# Patient Record
Sex: MALE | Race: WHITE | NOT HISPANIC OR LATINO | URBAN - METROPOLITAN AREA
[De-identification: names, ages, dates, MRNs, and addresses within clinical notes are randomized per-mention and may not be internally consistent; named-entity substitution may affect disease eponyms.]

---

## 2017-08-25 ENCOUNTER — OFFICE VISIT - HEALTHEAST (OUTPATIENT)
Dept: FAMILY MEDICINE | Facility: CLINIC | Age: 82
End: 2017-08-25

## 2017-08-25 DIAGNOSIS — Z80.42 FAMILY HISTORY OF MALIGNANT NEOPLASM OF PROSTATE: ICD-10-CM

## 2017-08-25 DIAGNOSIS — G45.9 TRANSIENT CEREBRAL ISCHEMIA, UNSPECIFIED TYPE: ICD-10-CM

## 2017-08-25 DIAGNOSIS — H35.30 MACULAR DEGENERATION (SENILE) OF RETINA: ICD-10-CM

## 2017-08-25 DIAGNOSIS — I10 ESSENTIAL HYPERTENSION WITH GOAL BLOOD PRESSURE LESS THAN 130/85: ICD-10-CM

## 2017-08-25 DIAGNOSIS — I50.9 HEART FAILURE (H): ICD-10-CM

## 2017-08-25 ASSESSMENT — MIFFLIN-ST. JEOR: SCORE: 1469.39

## 2017-08-28 ENCOUNTER — RECORDS - HEALTHEAST (OUTPATIENT)
Dept: ADMINISTRATIVE | Facility: OTHER | Age: 82
End: 2017-08-28

## 2017-09-06 ENCOUNTER — COMMUNICATION - HEALTHEAST (OUTPATIENT)
Dept: FAMILY MEDICINE | Facility: CLINIC | Age: 82
End: 2017-09-06

## 2018-08-24 ENCOUNTER — OFFICE VISIT - HEALTHEAST (OUTPATIENT)
Dept: FAMILY MEDICINE | Facility: CLINIC | Age: 83
End: 2018-08-24

## 2018-08-24 ENCOUNTER — COMMUNICATION - HEALTHEAST (OUTPATIENT)
Dept: FAMILY MEDICINE | Facility: CLINIC | Age: 83
End: 2018-08-24

## 2018-08-24 DIAGNOSIS — L71.9 ROSACEA: ICD-10-CM

## 2018-08-24 DIAGNOSIS — C43.4 MALIGNANT MELANOMA OF SKIN OF SCALP AND NECK (H): ICD-10-CM

## 2018-08-24 DIAGNOSIS — K21.9 GASTROESOPHAGEAL REFLUX DISEASE WITHOUT ESOPHAGITIS: ICD-10-CM

## 2018-08-24 DIAGNOSIS — C18.9 MALIGNANT NEOPLASM OF COLON, UNSPECIFIED PART OF COLON (H): ICD-10-CM

## 2018-08-24 DIAGNOSIS — Z80.42 FAMILY HISTORY OF MALIGNANT NEOPLASM OF PROSTATE: ICD-10-CM

## 2018-08-24 DIAGNOSIS — I50.9 HEART FAILURE (H): ICD-10-CM

## 2018-08-24 DIAGNOSIS — E78.2 MIXED HYPERLIPIDEMIA: ICD-10-CM

## 2018-08-24 DIAGNOSIS — I10 ESSENTIAL HYPERTENSION: ICD-10-CM

## 2018-08-24 DIAGNOSIS — F34.1 DYSTHYMIC DISORDER: ICD-10-CM

## 2018-08-24 DIAGNOSIS — H35.30 MACULAR DEGENERATION (SENILE) OF RETINA: ICD-10-CM

## 2018-08-24 DIAGNOSIS — G45.9 TRANSIENT CEREBRAL ISCHEMIA, UNSPECIFIED TYPE: ICD-10-CM

## 2018-08-24 LAB
ANION GAP SERPL CALCULATED.3IONS-SCNC: 12 MMOL/L (ref 5–18)
BUN SERPL-MCNC: 12 MG/DL (ref 8–28)
CALCIUM SERPL-MCNC: 9.8 MG/DL (ref 8.5–10.5)
CHLORIDE BLD-SCNC: 105 MMOL/L (ref 98–107)
CO2 SERPL-SCNC: 26 MMOL/L (ref 22–31)
CREAT SERPL-MCNC: 0.88 MG/DL (ref 0.7–1.3)
GFR SERPL CREATININE-BSD FRML MDRD: >60 ML/MIN/1.73M2
GLUCOSE BLD-MCNC: 107 MG/DL (ref 70–125)
POTASSIUM BLD-SCNC: 3.8 MMOL/L (ref 3.5–5)
SODIUM SERPL-SCNC: 143 MMOL/L (ref 136–145)

## 2018-09-25 ENCOUNTER — RECORDS - HEALTHEAST (OUTPATIENT)
Dept: ADMINISTRATIVE | Facility: OTHER | Age: 83
End: 2018-09-25

## 2019-07-29 ENCOUNTER — COMMUNICATION - HEALTHEAST (OUTPATIENT)
Dept: FAMILY MEDICINE | Facility: CLINIC | Age: 84
End: 2019-07-29

## 2019-08-14 ENCOUNTER — COMMUNICATION - HEALTHEAST (OUTPATIENT)
Dept: FAMILY MEDICINE | Facility: CLINIC | Age: 84
End: 2019-08-14

## 2019-08-14 DIAGNOSIS — I10 ESSENTIAL HYPERTENSION: ICD-10-CM

## 2019-08-14 DIAGNOSIS — I50.9 HEART FAILURE, UNSPECIFIED HF CHRONICITY, UNSPECIFIED HEART FAILURE TYPE (H): ICD-10-CM

## 2019-08-15 ENCOUNTER — COMMUNICATION - HEALTHEAST (OUTPATIENT)
Dept: FAMILY MEDICINE | Facility: CLINIC | Age: 84
End: 2019-08-15

## 2019-08-15 DIAGNOSIS — I10 ESSENTIAL HYPERTENSION: ICD-10-CM

## 2019-09-08 ENCOUNTER — COMMUNICATION - HEALTHEAST (OUTPATIENT)
Dept: FAMILY MEDICINE | Facility: CLINIC | Age: 84
End: 2019-09-08

## 2019-09-09 ENCOUNTER — OFFICE VISIT - HEALTHEAST (OUTPATIENT)
Dept: FAMILY MEDICINE | Facility: CLINIC | Age: 84
End: 2019-09-09

## 2019-09-09 DIAGNOSIS — C18.9 MALIGNANT NEOPLASM OF COLON, UNSPECIFIED PART OF COLON (H): ICD-10-CM

## 2019-09-09 DIAGNOSIS — I50.9 HEART FAILURE, UNSPECIFIED HF CHRONICITY, UNSPECIFIED HEART FAILURE TYPE (H): ICD-10-CM

## 2019-09-09 DIAGNOSIS — F41.1 ANXIETY STATE: ICD-10-CM

## 2019-09-09 DIAGNOSIS — C43.4 MALIGNANT MELANOMA OF SKIN OF SCALP AND NECK (H): ICD-10-CM

## 2019-09-09 DIAGNOSIS — H35.30 MACULAR DEGENERATION (SENILE) OF RETINA: ICD-10-CM

## 2019-09-09 DIAGNOSIS — G45.9 TIA (TRANSIENT ISCHEMIC ATTACK): ICD-10-CM

## 2019-09-09 DIAGNOSIS — L71.9 ROSACEA: ICD-10-CM

## 2019-09-09 DIAGNOSIS — K21.9 GASTROESOPHAGEAL REFLUX DISEASE WITHOUT ESOPHAGITIS: ICD-10-CM

## 2019-09-09 DIAGNOSIS — Z80.42 FAMILY HISTORY OF MALIGNANT NEOPLASM OF PROSTATE: ICD-10-CM

## 2019-09-09 DIAGNOSIS — E78.2 MIXED HYPERLIPIDEMIA: ICD-10-CM

## 2019-09-09 DIAGNOSIS — I10 ESSENTIAL HYPERTENSION: ICD-10-CM

## 2019-09-09 DIAGNOSIS — F34.1 DYSTHYMIC DISORDER: ICD-10-CM

## 2019-09-09 LAB
ANION GAP SERPL CALCULATED.3IONS-SCNC: 12 MMOL/L (ref 5–18)
BUN SERPL-MCNC: 14 MG/DL (ref 8–28)
CALCIUM SERPL-MCNC: 9.6 MG/DL (ref 8.5–10.5)
CHLORIDE BLD-SCNC: 106 MMOL/L (ref 98–107)
CO2 SERPL-SCNC: 24 MMOL/L (ref 22–31)
CREAT SERPL-MCNC: 1.24 MG/DL (ref 0.7–1.3)
GFR SERPL CREATININE-BSD FRML MDRD: 56 ML/MIN/1.73M2
GLUCOSE BLD-MCNC: 100 MG/DL (ref 70–125)
POTASSIUM BLD-SCNC: 4.7 MMOL/L (ref 3.5–5)
SODIUM SERPL-SCNC: 142 MMOL/L (ref 136–145)

## 2019-09-09 RX ORDER — FUROSEMIDE 20 MG
TABLET ORAL
Qty: 90 TABLET | Refills: 3 | Status: SHIPPED | OUTPATIENT
Start: 2019-09-09

## 2019-09-09 RX ORDER — AMLODIPINE BESYLATE 10 MG/1
TABLET ORAL
Qty: 90 TABLET | Refills: 3 | Status: SHIPPED | OUTPATIENT
Start: 2019-09-09

## 2019-09-09 RX ORDER — LISINOPRIL 5 MG/1
TABLET ORAL
Qty: 90 TABLET | Refills: 3 | Status: SHIPPED | OUTPATIENT
Start: 2019-09-09

## 2019-09-09 RX ORDER — METOPROLOL SUCCINATE 100 MG/1
100 TABLET, EXTENDED RELEASE ORAL DAILY
Qty: 90 TABLET | Refills: 3 | Status: SHIPPED | OUTPATIENT
Start: 2019-09-09

## 2019-09-10 ENCOUNTER — COMMUNICATION - HEALTHEAST (OUTPATIENT)
Dept: FAMILY MEDICINE | Facility: CLINIC | Age: 84
End: 2019-09-10

## 2019-11-21 ENCOUNTER — COMMUNICATION - HEALTHEAST (OUTPATIENT)
Dept: FAMILY MEDICINE | Facility: CLINIC | Age: 84
End: 2019-11-21

## 2019-11-21 DIAGNOSIS — I10 ESSENTIAL HYPERTENSION: ICD-10-CM

## 2019-11-21 DIAGNOSIS — I50.9 HEART FAILURE, UNSPECIFIED HF CHRONICITY, UNSPECIFIED HEART FAILURE TYPE (H): ICD-10-CM

## 2020-04-17 ENCOUNTER — COMMUNICATION - HEALTHEAST (OUTPATIENT)
Dept: FAMILY MEDICINE | Facility: CLINIC | Age: 85
End: 2020-04-17

## 2020-06-06 ENCOUNTER — COMMUNICATION - HEALTHEAST (OUTPATIENT)
Dept: SCHEDULING | Facility: CLINIC | Age: 85
End: 2020-06-06

## 2020-08-13 ENCOUNTER — RECORDS - HEALTHEAST (OUTPATIENT)
Dept: ADMINISTRATIVE | Facility: OTHER | Age: 85
End: 2020-08-13

## 2020-08-27 ENCOUNTER — RECORDS - HEALTHEAST (OUTPATIENT)
Dept: ADMINISTRATIVE | Facility: OTHER | Age: 85
End: 2020-08-27

## 2020-09-10 ENCOUNTER — RECORDS - HEALTHEAST (OUTPATIENT)
Dept: ADMINISTRATIVE | Facility: OTHER | Age: 85
End: 2020-09-10

## 2020-09-26 ENCOUNTER — COMMUNICATION - HEALTHEAST (OUTPATIENT)
Dept: FAMILY MEDICINE | Facility: CLINIC | Age: 85
End: 2020-09-26

## 2020-09-28 ENCOUNTER — COMMUNICATION - HEALTHEAST (OUTPATIENT)
Dept: FAMILY MEDICINE | Facility: CLINIC | Age: 85
End: 2020-09-28

## 2021-05-25 ENCOUNTER — RECORDS - HEALTHEAST (OUTPATIENT)
Dept: ADMINISTRATIVE | Facility: CLINIC | Age: 86
End: 2021-05-25

## 2021-05-26 ENCOUNTER — RECORDS - HEALTHEAST (OUTPATIENT)
Dept: ADMINISTRATIVE | Facility: CLINIC | Age: 86
End: 2021-05-26

## 2021-05-29 ENCOUNTER — RECORDS - HEALTHEAST (OUTPATIENT)
Dept: ADMINISTRATIVE | Facility: CLINIC | Age: 86
End: 2021-05-29

## 2021-05-30 ENCOUNTER — RECORDS - HEALTHEAST (OUTPATIENT)
Dept: ADMINISTRATIVE | Facility: CLINIC | Age: 86
End: 2021-05-30

## 2021-05-31 VITALS — HEIGHT: 68 IN | WEIGHT: 181.4 LBS | BODY MASS INDEX: 27.49 KG/M2

## 2021-05-31 NOTE — TELEPHONE ENCOUNTER
Due to be seen    Rx renewed per Medication Renewal Policy. Medication was last renewed on 8/24/18.    Veronica Guaman, [facility] Triage/Med Refill 8/15/2019     Requested Prescriptions   Pending Prescriptions Disp Refills     metoprolol succinate (TOPROL-XL) 100 MG 24 hr tablet [Pharmacy Med Name: METOPROLOL ER SUCCINATE 100MG TABS] 90 tablet 0     Sig: TAKE 1 TABLET(100 MG) BY MOUTH DAILY       Beta-Blockers Refill Protocol Passed - 8/15/2019  6:52 PM        Passed - PCP or prescribing provider visit in past 12 months or next 3 months     Last office visit with prescriber/PCP: 8/24/2018 Shaunna Padron MD OR same dept: 8/24/2018 Shaunna Padron MD OR same specialty: 8/24/2018 Shaunna Padron MD  Last physical: 8/25/2017 Last MTM visit: Visit date not found   Next visit within 3 mo: Visit date not found  Next physical within 3 mo: Visit date not found  Prescriber OR PCP: Shaunna Padron MD  Last diagnosis associated with med order: There are no diagnoses linked to this encounter.  If protocol passes may refill for 12 months if within 3 months of last provider visit (or a total of 15 months).             Passed - Blood pressure filed in past 12 months     BP Readings from Last 1 Encounters:   08/24/18 108/66

## 2021-05-31 NOTE — TELEPHONE ENCOUNTER
Due to be seen with labs    Rx renewed per Medication Renewal Policy. Medication was last renewed on 8/24/18.    Veronica Guaman, [facility] Triage/Med Refill 8/14/2019     Requested Prescriptions   Pending Prescriptions Disp Refills     lisinopril (PRINIVIL,ZESTRIL) 5 MG tablet 90 tablet 3     Sig: TAKE 1 TABLET BY MOUTH ONCE DAILY       Ace Inhibitors Refill Protocol Passed - 8/14/2019  1:49 PM        Passed - PCP or prescribing provider visit in past 12 months       Last office visit with prescriber/PCP: 8/24/2018 Shaunna Padron MD OR same dept: 8/24/2018 Shaunna Padron MD OR same specialty: 8/24/2018 Shaunna Padron MD  Last physical: 8/25/2017 Last MTM visit: Visit date not found   Next visit within 3 mo: Visit date not found  Next physical within 3 mo: Visit date not found  Prescriber OR PCP: Shaunna Padron MD  Last diagnosis associated with med order: There are no diagnoses linked to this encounter.  If protocol passes may refill for 12 months if within 3 months of last provider visit (or a total of 15 months).             Passed - Serum Potassium in past 12 months     Lab Results   Component Value Date    Potassium 3.8 08/24/2018             Passed - Blood pressure filed in past 12 months     BP Readings from Last 1 Encounters:   08/24/18 108/66             Passed - Serum Creatinine in past 12 months     Creatinine   Date Value Ref Range Status   08/24/2018 0.88 0.70 - 1.30 mg/dL Final             amLODIPine (NORVASC) 10 MG tablet 90 tablet 3     Sig: TAKE 1 TABLET(10 MG TOTAL) BY MOUTH EVERY DAY       Calcium-Channel Blockers Protocol Passed - 8/14/2019  1:49 PM        Passed - PCP or prescribing provider visit in past 12 months or next 3 months     Last office visit with prescriber/PCP: 8/24/2018 Shaunna Padron MD OR same dept: 8/24/2018 Shaunna Padron MD OR same specialty: 8/24/2018 Shaunna Padron MD  Last physical: 8/25/2017 Last MTM visit: Visit date not found   Next visit within 3  mo: Visit date not found  Next physical within 3 mo: Visit date not found  Prescriber OR PCP: Shaunna Padron MD  Last diagnosis associated with med order: There are no diagnoses linked to this encounter.  If protocol passes may refill for 12 months if within 3 months of last provider visit (or a total of 15 months).             Passed - Blood pressure filed in past 12 months     BP Readings from Last 1 Encounters:   08/24/18 108/66             furosemide (LASIX) 20 MG tablet 90 tablet 3     Sig: TAKE 1 TABLET BY MOUTH ONCE DAILY       Diuretics/Combination Diuretics Refill Protocol  Passed - 8/14/2019  1:49 PM        Passed - Visit with PCP or prescribing provider visit in past 12 months     Last office visit with prescriber/PCP: 8/24/2018 Shaunna Padron MD OR same dept: 8/24/2018 Shaunna Padron MD OR same specialty: 8/24/2018 Shaunna Padron MD  Last physical: 8/25/2017 Last MTM visit: Visit date not found   Next visit within 3 mo: Visit date not found  Next physical within 3 mo: Visit date not found  Prescriber OR PCP: Shaunna Padron MD  Last diagnosis associated with med order: There are no diagnoses linked to this encounter.  If protocol passes may refill for 12 months if within 3 months of last provider visit (or a total of 15 months).             Passed - Serum Potassium in past 12 months      Lab Results   Component Value Date    Potassium 3.8 08/24/2018             Passed - Serum Sodium in past 12 months      Lab Results   Component Value Date    Sodium 143 08/24/2018             Passed - Blood pressure on file in past 12 months     BP Readings from Last 1 Encounters:   08/24/18 108/66             Passed - Serum Creatinine in past 12 months      Creatinine   Date Value Ref Range Status   08/24/2018 0.88 0.70 - 1.30 mg/dL Final

## 2021-06-01 VITALS — WEIGHT: 178.2 LBS | BODY MASS INDEX: 27.5 KG/M2

## 2021-06-01 NOTE — PROGRESS NOTES
Chief complaint: Medication refill    HPI: The patient has hypertension and heart failure and a plethora of other medical issues.  He does not want to treat anything but is high blood pressure and his heart failure so he will continue to take the medications he needed refilled today.  He is willing to do a basic metabolic panel lab and get his refills but he is really not willing to do anything else.    His wife says that they get the flu shots at the pharmacy    Both of them are hard of hearing and wears hearing aids.  The patient himself is also wearing glasses.  He would prefer to die in his sleep at home but is not yet ready to stop taking all of his medications.  He does not want any interventions aside from the minimal blood pressure and diuretic medication.    Objective:/59 (Patient Site: Right Arm, Patient Position: Sitting, Cuff Size: Adult Regular)   Pulse (!) 58   Wt 163 lb 11.2 oz (74.3 kg)   SpO2 93%   BMI 25.26 kg/m    He breathes somewhat rapidly.  He is wearing hearing aids bilaterally and glasses.  His heart and lung exam is pretty unremarkable he does have a little of pedal edema and it gets worse of the day goes on because he sits all day in his chair.  His wife tries to encourage him to put his feet up but he just puts them up on a footstool, they are not elevated above the level of his heart by any means.    Assessment.  Hypertension  Heart failure  Hyperlipidemia which she will not treat  History of prostate cancer  History of colon cancer  History of skin cancer  Rosacea  TIA    Plan: He only wanted to treat the blood pressure and heart failure so I will refill those medications and do a basic metabolic panel.  He will be notified of the results.  I have encouraged them to get a flu shot.  He will come back in 1 year sooner as needed    At this point they did not want any referral to home care just to see if there are any other aids that his wife could have she has access to some  "resources in the community.  They did not want to do any hospice care or hospice referral but his wife will let me know if she wants to pursue any other assistance for helping to manage him at home.  He is likely having ongoing neurologic events because he will sometimes \"zone out\" for an hour or 2 and then come back to being lucid and aware.  Again they do not want to do anything about these issues so until or unless they do, we will just provide whatever support they are willing to accept.  "

## 2021-06-02 ENCOUNTER — RECORDS - HEALTHEAST (OUTPATIENT)
Dept: ADMINISTRATIVE | Facility: CLINIC | Age: 86
End: 2021-06-02

## 2021-06-03 VITALS
SYSTOLIC BLOOD PRESSURE: 101 MMHG | BODY MASS INDEX: 25.26 KG/M2 | DIASTOLIC BLOOD PRESSURE: 59 MMHG | OXYGEN SATURATION: 93 % | HEART RATE: 58 BPM | WEIGHT: 163.7 LBS

## 2021-06-08 NOTE — TELEPHONE ENCOUNTER
[facility].  Son has called in to receive services.  Hospice orders needed, please call [facility] [phone]    Jessica Valladares RN, MA  [facility]    Triage Nurse Advisor

## 2021-06-12 NOTE — PROGRESS NOTES
ASSESSMENT:  1. Heart failure   meds refilled    2. Prostate Cancer  Followed for 5 years; PSA was 0, so not doing any more follow up    3. Macular Degeneration  No further treatment available    4. Transient cerebral ischemia, unspecified type       5. Essential hypertension with goal blood pressure less than 130/85     - Comprehensive Metabolic Panel         PLAN:  He wanted only the bare minimum to be done today so we will do As a metabolic panel and refill his medications.  [facility]    Orders Placed This Encounter   Procedures     Comprehensive Metabolic Panel     Medications Discontinued During This Encounter   Medication Reason     amLODIPine (NORVASC) 10 MG tablet Reorder     atenolol (TENORMIN) 100 MG tablet Reorder     furosemide (LASIX) 20 MG tablet Reorder     lisinopril (PRINIVIL,ZESTRIL) 5 MG tablet Reorder         Health Maintenance Due   Topic Date Due     FALL RISK ASSESSMENT  01/07/2000     COLONOSCOPY  09/09/2016     INFLUENZA VACCINE RULE BASED (1) 08/01/2017         CHIEF COMPLAINT:  Chief Complaint   Patient presents with     Annual Exam     not fasting     Medication Refill     Speech Problem     with confusion since stroke 2 years ago         HISTORY OF PRESENT ILLNESS:  Terence Arellano is a 82 y.o. male presenting to the clinic today for a physical exam.     He is here with his wife.  He wears trifocals but his macular degeneration is so significant, but he does not see very well at all and he does not have much depth perception.  He has bilateral hearing aids and still does not hear very well.  His wife also does not hear very well.    He is not fasting and does not want to have fasting lab completed.  He really does not want to have anything done but he is willing to continue taking his medication for his heart and to avoid another stroke.  I told him that in order to do that, we needed to do some blood work so they are willing to do  that.    He is having more difficulty with his speech but he is not willing to do any therapy for that at this time.    He and his wife moved to a 1 level [facility] and she takes care of his pills and takes care of him.  When he is out and about he uses his cane otherwise he walks around furniture very slowly at home in order to avoid a fall.    His teeth are capped but he still does not brush his teeth.  We talked about the importance of continuing to brush his teeth so that he did not get gum disease which could cause some problems with heart disease.    Healthy Habits:   Patient reports regular exercise, dental and eye exams. Uses healthy diet. Always uses seatbelts. Reports uses medications as directed.  Alcohol: rare  Smoking: none  Caffeine use: 2-3 per day  Drug use: none  Birth control: abstinence    REVIEW OF SYSTEMS:   All other systems are negative.    PFSH:     History   Smoking Status     Former Smoker   Smokeless Tobacco     Never Used     Family History   Problem Relation Age of Onset     Dementia Mother      Colon cancer Father      Social History     Social History     Marital status:      Spouse name: N/A     Number of children: N/A     Years of education: N/A     Occupational History     Not on file.     Social History Main Topics     Smoking status: Former Smoker     Smokeless tobacco: Never Used     Alcohol use No      Comment: rare     Drug use: No     Sexual activity: Not on file     Other Topics Concern     Not on file     Social History Narrative     Past Surgical History:   Procedure Laterality Date     MI ABDOMEN SURGERY PROC UNLISTED      Description: Hernia Repair;  Recorded: 12/06/2008;     MI AMPUTATION TOE,MT-P JT      Description: Surgery Left Foot Amputation MTP Fifth Toe;  Recorded: 12/06/2008;  Comments: 1948     MI APPENDECTOMY      Description: Appendectomy;  Recorded: 01/24/2011;     MI INCISE FINGER TENDON SHEATH      Description: Hand Incision Tendon Sheath Of A Finger;  " Recorded: 10/29/2008;     CA PART REMOVAL COLON W ANASTOMOSIS      Description: Partial Colectomy;  Recorded: 01/24/2011;  Comments: adjuvant chem as well     Allergies   Allergen Reactions     Atorvastatin      muscle pain       Ezetimibe      muscle pain       Penicillins Hives     Simvastatin      Annotation: muscle pain and ankle swelling       Active Ambulatory Problems     Diagnosis Date Noted     Esophageal reflux      Hyperlipidemia      Anxiety      Dysthymic Disorder      Xerosis Cutis      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      Diverticulosis      Hypertension      Rosacea      Carcinoma Of The Large Intestine      Malignant Melanoma Of The Scalp      Prostate Cancer      Macular Degeneration      TIA (transient ischemic attack) 06/06/2015     Heart failure 06/15/2015     Resolved Ambulatory Problems     Diagnosis Date Noted     Prostate Cancer      Past Medical History:   Diagnosis Date     Carcinoma of large intestine      Dementia      Diverticulosis      Esophageal reflux      Hyperlipidemia      Macular degeneration      Malignant melanoma of scalp or neck      Prostate cancer      Rosacea      TIA (transient ischemic attack)      Xerosis cutis        VITALS:  Vitals:    08/25/17 1335   BP: 124/82   Patient Site: Right Arm   Patient Position: Sitting   Cuff Size: Adult Regular   Pulse: 72   Weight: 181 lb 6.4 oz (82.3 kg)   Height: 5' 7.5\" (1.715 m)     BP Readings from Last 3 Encounters:   08/25/17 124/82   07/29/16 108/80   10/05/15 134/66     Wt Readings from Last 3 Encounters:   08/25/17 181 lb 6.4 oz (82.3 kg)   07/29/16 187 lb 3.2 oz (84.9 kg)   10/05/15 191 lb 3.2 oz (86.7 kg)     Body mass index is 27.99 kg/(m^2).    PHYSICAL EXAM:  General Appearance: Alert, cooperative, no distress, appears older than stated age  Head: Normocephalic, without obvious abnormality, atraumatic  Eyes: PERRL, conjunctiva/corneas clear, EOM's intact. Trifocal glasses  Ears: bilateral hearing " aids  Nose: Nares normal, septum midline,mucosa normal, no drainage  Throat: Lips, mucosa, and tongue normal; teeth and gums normal. Teeth are capped  Neck: Supple, symmetrical, trachea midline,   Back:    Lungs: Clear to auscultation bilaterally . He breathes rapidly and shallowly; this is not new  Heart: Regular rate and rhythm, S1 and S2 normal, no murmur, rub, or gallop   Extremities: Extremities normal, atraumatic, no cyanosis or edema  Skin: Skin color, texture, turgor normal, no rashes or lesions       QUALITY MEASURES:  The following are part of a depression follow up plan for the patient:  coping support management and emotional support assessment. Most of this is from the hearing decrease and the vision impairment     MEDICATIONS:  Current Outpatient Prescriptions   Medication Sig Dispense Refill     amLODIPine (NORVASC) 10 MG tablet TAKE 1 TABLET(10 MG TOTAL) BY MOUTH EVERY DAY 90 tablet 3     aspirin 81 MG EC tablet Take 81 mg by mouth daily.       atenolol (TENORMIN) 100 MG tablet Take 0.5 tablets (50 mg total) by mouth 2 (two) times a day. 90 tablet 3     cholecalciferol, vitamin D3, 1,000 unit tablet Take 2,000 Units by mouth daily.       furosemide (LASIX) 20 MG tablet TAKE 1 TABLET BY MOUTH ONCE DAILY 90 tablet 3     lisinopril (PRINIVIL,ZESTRIL) 5 MG tablet TAKE 1 TABLET BY MOUTH ONCE DAILY 90 tablet 3     lutein 6 mg cap Take 1 capsule by mouth daily.       MULTIVIT &MINERALS/FERROUS FUM (MULTI VITAMIN ORAL) Take 1 tablet by mouth daily.       omeprazole (PRILOSEC OTC) 20 MG tablet Take 10 mg by mouth 2 (two) times a week.        VIT C/E/ZN/COPPR/LUTEIN/ZEAXAN (PRESERVISION AREDS 2 ORAL) Take by mouth.       metoprolol succinate (TOPROL XL) 100 MG 24 hr tablet Take 1 tablet (100 mg total) by mouth daily. 90 tablet 3     No current facility-administered medications for this visit.

## 2021-06-20 NOTE — PROGRESS NOTES
"Chief complaint: Medication check    HPI: This 83-year-old gentleman with multiple medical problems is here with his wife.  Both of them are hearing impaired wearing hearing aids and both of them are wearing glasses.  He does not want anything done, he does not want any testing he does not want any further evaluation he just wants to continue on the medications he is on at this time.    His wife made the appointment online because she wanted to keep me informed that he is starting to have some changes in his cognition and speech etc.  Since he does not want to change anything, she did not want to change anything he just needed a refill on his medications.  I need to do at a minimum a basic metabolic panel because of his blood pressure medications.  He denies having dizziness or lightheadedness when he changes position so they do not think that his blood pressures getting too low.  They do have a blood pressure cuff at home and it has been calibrated so if he does start to have those symptoms, we would adjust his medications a little bit.    The patient's wife wanted this information documented in his chart.  We can scan it and under miscellaneous as well.    \"Patient has had considerable decline in the last few months.    Confusion at times, has trouble following directions, comprehending speech.  One \"wandering\" early morning went out looking for a  friend.  I have since put a key pad deadbolt on the front door.    Has trouble forming words, sleeps more.  Sleeping 12-14 hours per night is standard.    He does eat well, so far is mostly \"continent\".  Has had a few mishaps but only if away from home and facilities and only urinating.  Only complains of pain if he walks too much or twists his knee.    So far he is able to do most of the daily personal care.  He can toilet, feed, shower and mostly dress.  He shaves with an electric razor and can brush teeth but cannot put the toothpaste on the brush.  He can " "sometimes need assistance dressing appropriately and that seems to be happening more frequently.    We are still managing and hope to continue just wanted us to be part of his record in the event it is needed in the future he is very mild mannered and not agitated or aggressive at all.\"    They have an advanced directive but she was also asking about a [address] form that is much more specific about end-of-life decisions and wants and needs.  Since he does not really want anything to be done he wants to pass naturally, his son who is an EMT suggested that they get this form filled out.  Since both of them are hard of hearing, I do not think it be very easy to accomplish this over the phone and if they want to have this form filled out with me and have me sign it we would have to review it at an office visit so we could review it together.    Objective:/66 (Patient Site: Right Arm, Patient Position: Sitting, Cuff Size: Adult Regular)  Pulse 60  Wt 178 lb 3.2 oz (80.8 kg)  BMI 27.5 kg/m2  He is wearing glasses and hearing aids and he has a lot of rosacea on his face.  He uses a cane when he walks.  He always breathes shallowly and rapidly and that is his baseline.  His lungs are clear to auscultation and his cardiac exam is unremarkable.  He left his head on for the duration of the visit today.  Again he wanted only the minimum amounts done.    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  I refilled his medications for another year if they want the other forms filled out off to make another appointment so we can discuss that.

## 2021-08-22 ENCOUNTER — HEALTH MAINTENANCE LETTER (OUTPATIENT)
Age: 86
End: 2021-08-22

## 2021-10-17 ENCOUNTER — HEALTH MAINTENANCE LETTER (OUTPATIENT)
Age: 86
End: 2021-10-17

## 2022-10-01 ENCOUNTER — HEALTH MAINTENANCE LETTER (OUTPATIENT)
Age: 87
End: 2022-10-01

## 2023-10-21 ENCOUNTER — HEALTH MAINTENANCE LETTER (OUTPATIENT)
Age: 88
End: 2023-10-21